# Patient Record
Sex: MALE | Race: WHITE | NOT HISPANIC OR LATINO
[De-identification: names, ages, dates, MRNs, and addresses within clinical notes are randomized per-mention and may not be internally consistent; named-entity substitution may affect disease eponyms.]

---

## 2018-01-25 PROBLEM — Z00.00 ENCOUNTER FOR PREVENTIVE HEALTH EXAMINATION: Status: ACTIVE | Noted: 2018-01-25

## 2018-02-09 ENCOUNTER — APPOINTMENT (OUTPATIENT)
Dept: SURGERY | Facility: CLINIC | Age: 63
End: 2018-02-09

## 2018-11-29 ENCOUNTER — APPOINTMENT (OUTPATIENT)
Dept: SURGERY | Facility: CLINIC | Age: 63
End: 2018-11-29

## 2018-11-29 ENCOUNTER — OUTPATIENT (OUTPATIENT)
Dept: OUTPATIENT SERVICES | Facility: HOSPITAL | Age: 63
LOS: 1 days | End: 2018-11-29
Payer: COMMERCIAL

## 2018-11-29 DIAGNOSIS — H72.01 CENTRAL PERFORATION OF TYMPANIC MEMBRANE, RIGHT EAR: ICD-10-CM

## 2018-11-29 DIAGNOSIS — Z01.818 ENCOUNTER FOR OTHER PREPROCEDURAL EXAMINATION: ICD-10-CM

## 2018-11-29 LAB
ALBUMIN SERPL ELPH-MCNC: 4.7 G/DL — SIGNIFICANT CHANGE UP (ref 3.3–5)
ALP SERPL-CCNC: 91 U/L — SIGNIFICANT CHANGE UP (ref 40–120)
ALT FLD-CCNC: 46 U/L — HIGH (ref 10–45)
ANION GAP SERPL CALC-SCNC: 11 MMOL/L — SIGNIFICANT CHANGE UP (ref 5–17)
APTT BLD: 31.8 SEC — SIGNIFICANT CHANGE UP (ref 27.5–36.3)
AST SERPL-CCNC: 35 U/L — SIGNIFICANT CHANGE UP (ref 10–40)
BILIRUB SERPL-MCNC: 0.7 MG/DL — SIGNIFICANT CHANGE UP (ref 0.2–1.2)
BUN SERPL-MCNC: 12 MG/DL — SIGNIFICANT CHANGE UP (ref 7–23)
CALCIUM SERPL-MCNC: 9.9 MG/DL — SIGNIFICANT CHANGE UP (ref 8.4–10.5)
CHLORIDE SERPL-SCNC: 100 MMOL/L — SIGNIFICANT CHANGE UP (ref 96–108)
CO2 SERPL-SCNC: 27 MMOL/L — SIGNIFICANT CHANGE UP (ref 22–31)
CREAT SERPL-MCNC: 1 MG/DL — SIGNIFICANT CHANGE UP (ref 0.5–1.3)
GLUCOSE SERPL-MCNC: 83 MG/DL — SIGNIFICANT CHANGE UP (ref 70–99)
HCT VFR BLD CALC: 40.9 % — SIGNIFICANT CHANGE UP (ref 39–50)
HGB BLD-MCNC: 13.7 G/DL — SIGNIFICANT CHANGE UP (ref 13–17)
INR BLD: 1.06 — SIGNIFICANT CHANGE UP (ref 0.88–1.16)
MCHC RBC-ENTMCNC: 30.9 PG — SIGNIFICANT CHANGE UP (ref 27–34)
MCHC RBC-ENTMCNC: 33.5 G/DL — SIGNIFICANT CHANGE UP (ref 32–36)
MCV RBC AUTO: 92.1 FL — SIGNIFICANT CHANGE UP (ref 80–100)
PLATELET # BLD AUTO: 272 K/UL — SIGNIFICANT CHANGE UP (ref 150–400)
POTASSIUM SERPL-MCNC: 4.4 MMOL/L — SIGNIFICANT CHANGE UP (ref 3.5–5.3)
POTASSIUM SERPL-SCNC: 4.4 MMOL/L — SIGNIFICANT CHANGE UP (ref 3.5–5.3)
PROT SERPL-MCNC: 8.1 G/DL — SIGNIFICANT CHANGE UP (ref 6–8.3)
PROTHROM AB SERPL-ACNC: 12 SEC — SIGNIFICANT CHANGE UP (ref 10–12.9)
RBC # BLD: 4.44 M/UL — SIGNIFICANT CHANGE UP (ref 4.2–5.8)
RBC # FLD: 12.7 % — SIGNIFICANT CHANGE UP (ref 10.3–16.9)
SODIUM SERPL-SCNC: 138 MMOL/L — SIGNIFICANT CHANGE UP (ref 135–145)
WBC # BLD: 6.8 K/UL — SIGNIFICANT CHANGE UP (ref 3.8–10.5)
WBC # FLD AUTO: 6.8 K/UL — SIGNIFICANT CHANGE UP (ref 3.8–10.5)

## 2018-11-29 PROCEDURE — 93010 ELECTROCARDIOGRAM REPORT: CPT

## 2018-12-12 ENCOUNTER — OUTPATIENT (OUTPATIENT)
Dept: OUTPATIENT SERVICES | Facility: HOSPITAL | Age: 63
LOS: 1 days | Discharge: HOME | End: 2018-12-12

## 2021-12-21 ENCOUNTER — HOSPITAL ENCOUNTER (OUTPATIENT)
Age: 66
Discharge: HOME | End: 2021-12-22
Attending: STUDENT IN AN ORGANIZED HEALTH CARE EDUCATION/TRAINING PROGRAM | Admitting: STUDENT IN AN ORGANIZED HEALTH CARE EDUCATION/TRAINING PROGRAM
Payer: MEDICARE

## 2021-12-21 DIAGNOSIS — I73.9 PVD (PERIPHERAL VASCULAR DISEASE) (HCC): ICD-10-CM

## 2021-12-21 LAB
ACT BLD: 154 SECS (ref 79–138)
ACT BLD: 244 SECS (ref 79–138)

## 2021-12-21 PROCEDURE — 74011000250 HC RX REV CODE- 250: Performed by: STUDENT IN AN ORGANIZED HEALTH CARE EDUCATION/TRAINING PROGRAM

## 2021-12-21 PROCEDURE — 77030002996 HC SUT SLK J&J -A: Performed by: STUDENT IN AN ORGANIZED HEALTH CARE EDUCATION/TRAINING PROGRAM

## 2021-12-21 PROCEDURE — C1725 CATH, TRANSLUMIN NON-LASER: HCPCS | Performed by: STUDENT IN AN ORGANIZED HEALTH CARE EDUCATION/TRAINING PROGRAM

## 2021-12-21 PROCEDURE — C1769 GUIDE WIRE: HCPCS | Performed by: STUDENT IN AN ORGANIZED HEALTH CARE EDUCATION/TRAINING PROGRAM

## 2021-12-21 PROCEDURE — C1887 CATHETER, GUIDING: HCPCS | Performed by: STUDENT IN AN ORGANIZED HEALTH CARE EDUCATION/TRAINING PROGRAM

## 2021-12-21 PROCEDURE — 77030004515 HC CATH ANGI DX AVU ANGI -C: Performed by: STUDENT IN AN ORGANIZED HEALTH CARE EDUCATION/TRAINING PROGRAM

## 2021-12-21 PROCEDURE — 74011000636 HC RX REV CODE- 636: Performed by: STUDENT IN AN ORGANIZED HEALTH CARE EDUCATION/TRAINING PROGRAM

## 2021-12-21 PROCEDURE — C2623 CATH, TRANSLUMIN, DRUG-COAT: HCPCS | Performed by: STUDENT IN AN ORGANIZED HEALTH CARE EDUCATION/TRAINING PROGRAM

## 2021-12-21 PROCEDURE — C1894 INTRO/SHEATH, NON-LASER: HCPCS | Performed by: STUDENT IN AN ORGANIZED HEALTH CARE EDUCATION/TRAINING PROGRAM

## 2021-12-21 PROCEDURE — 74011250637 HC RX REV CODE- 250/637: Performed by: STUDENT IN AN ORGANIZED HEALTH CARE EDUCATION/TRAINING PROGRAM

## 2021-12-21 PROCEDURE — 77030019697 HC SYR ANGI INFL MRTM -B: Performed by: STUDENT IN AN ORGANIZED HEALTH CARE EDUCATION/TRAINING PROGRAM

## 2021-12-21 PROCEDURE — 77030013797 HC KT TRNSDUC PRSSR EDWD -A: Performed by: STUDENT IN AN ORGANIZED HEALTH CARE EDUCATION/TRAINING PROGRAM

## 2021-12-21 PROCEDURE — 37220 HC PTA ILIAC INIT: CPT | Performed by: STUDENT IN AN ORGANIZED HEALTH CARE EDUCATION/TRAINING PROGRAM

## 2021-12-21 PROCEDURE — 99153 MOD SED SAME PHYS/QHP EA: CPT | Performed by: STUDENT IN AN ORGANIZED HEALTH CARE EDUCATION/TRAINING PROGRAM

## 2021-12-21 PROCEDURE — 74011250636 HC RX REV CODE- 250/636: Performed by: STUDENT IN AN ORGANIZED HEALTH CARE EDUCATION/TRAINING PROGRAM

## 2021-12-21 PROCEDURE — 85347 COAGULATION TIME ACTIVATED: CPT

## 2021-12-21 PROCEDURE — 75716 ARTERY X-RAYS ARMS/LEGS: CPT | Performed by: STUDENT IN AN ORGANIZED HEALTH CARE EDUCATION/TRAINING PROGRAM

## 2021-12-21 PROCEDURE — 99152 MOD SED SAME PHYS/QHP 5/>YRS: CPT | Performed by: STUDENT IN AN ORGANIZED HEALTH CARE EDUCATION/TRAINING PROGRAM

## 2021-12-21 PROCEDURE — 36200 PLACE CATHETER IN AORTA: CPT | Performed by: STUDENT IN AN ORGANIZED HEALTH CARE EDUCATION/TRAINING PROGRAM

## 2021-12-21 RX ORDER — HEPARIN SODIUM 200 [USP'U]/100ML
INJECTION, SOLUTION INTRAVENOUS
Status: COMPLETED | OUTPATIENT
Start: 2021-12-21 | End: 2021-12-21

## 2021-12-21 RX ORDER — LIDOCAINE HYDROCHLORIDE 10 MG/ML
INJECTION INFILTRATION; PERINEURAL AS NEEDED
Status: DISCONTINUED | OUTPATIENT
Start: 2021-12-21 | End: 2021-12-21 | Stop reason: HOSPADM

## 2021-12-21 RX ORDER — IODIXANOL 320 MG/ML
INJECTION, SOLUTION INTRAVASCULAR AS NEEDED
Status: DISCONTINUED | OUTPATIENT
Start: 2021-12-21 | End: 2021-12-21 | Stop reason: HOSPADM

## 2021-12-21 RX ORDER — OMEPRAZOLE 40 MG/1
40 CAPSULE, DELAYED RELEASE ORAL
COMMUNITY

## 2021-12-21 RX ORDER — METHOTREXATE 2.5 MG/1
15 TABLET ORAL
COMMUNITY

## 2021-12-21 RX ORDER — FOLIC ACID 1 MG/1
1 TABLET ORAL DAILY
Status: DISCONTINUED | OUTPATIENT
Start: 2021-12-22 | End: 2021-12-22 | Stop reason: HOSPADM

## 2021-12-21 RX ORDER — METHOTREXATE 2.5 MG/1
2.5 TABLET ORAL
Status: DISCONTINUED | OUTPATIENT
Start: 2021-12-25 | End: 2021-12-22 | Stop reason: HOSPADM

## 2021-12-21 RX ORDER — PRAVASTATIN SODIUM 40 MG/1
40 TABLET ORAL
COMMUNITY

## 2021-12-21 RX ORDER — FENTANYL CITRATE 50 UG/ML
INJECTION, SOLUTION INTRAMUSCULAR; INTRAVENOUS AS NEEDED
Status: DISCONTINUED | OUTPATIENT
Start: 2021-12-21 | End: 2021-12-21 | Stop reason: HOSPADM

## 2021-12-21 RX ORDER — GUAIFENESIN 100 MG/5ML
LIQUID (ML) ORAL AS NEEDED
Status: DISCONTINUED | OUTPATIENT
Start: 2021-12-21 | End: 2021-12-21 | Stop reason: HOSPADM

## 2021-12-21 RX ORDER — SODIUM CHLORIDE 0.9 % (FLUSH) 0.9 %
5-40 SYRINGE (ML) INJECTION AS NEEDED
Status: CANCELLED | OUTPATIENT
Start: 2021-12-21

## 2021-12-21 RX ORDER — DILTIAZEM HYDROCHLORIDE 120 MG/1
120 CAPSULE, EXTENDED RELEASE ORAL DAILY
COMMUNITY

## 2021-12-21 RX ORDER — CLOPIDOGREL BISULFATE 75 MG/1
75 TABLET ORAL DAILY
Status: DISCONTINUED | OUTPATIENT
Start: 2021-12-22 | End: 2021-12-22 | Stop reason: HOSPADM

## 2021-12-21 RX ORDER — CLOPIDOGREL BISULFATE 75 MG/1
TABLET ORAL AS NEEDED
Status: DISCONTINUED | OUTPATIENT
Start: 2021-12-21 | End: 2021-12-21 | Stop reason: HOSPADM

## 2021-12-21 RX ORDER — HEPARIN SODIUM 1000 [USP'U]/ML
INJECTION, SOLUTION INTRAVENOUS; SUBCUTANEOUS AS NEEDED
Status: DISCONTINUED | OUTPATIENT
Start: 2021-12-21 | End: 2021-12-21 | Stop reason: HOSPADM

## 2021-12-21 RX ORDER — SODIUM CHLORIDE 0.9 % (FLUSH) 0.9 %
5-40 SYRINGE (ML) INJECTION EVERY 8 HOURS
Status: CANCELLED | OUTPATIENT
Start: 2021-12-21

## 2021-12-21 RX ORDER — MIDAZOLAM HYDROCHLORIDE 1 MG/ML
INJECTION, SOLUTION INTRAMUSCULAR; INTRAVENOUS AS NEEDED
Status: DISCONTINUED | OUTPATIENT
Start: 2021-12-21 | End: 2021-12-21 | Stop reason: HOSPADM

## 2021-12-21 RX ORDER — CYCLOBENZAPRINE HCL 10 MG
10 TABLET ORAL 2 TIMES DAILY
Status: DISCONTINUED | OUTPATIENT
Start: 2021-12-21 | End: 2021-12-22 | Stop reason: HOSPADM

## 2021-12-21 RX ORDER — CYCLOBENZAPRINE HCL 10 MG
10 TABLET ORAL 2 TIMES DAILY
COMMUNITY

## 2021-12-21 RX ORDER — PANTOPRAZOLE SODIUM 40 MG/1
40 TABLET, DELAYED RELEASE ORAL
Status: DISCONTINUED | OUTPATIENT
Start: 2021-12-22 | End: 2021-12-22 | Stop reason: HOSPADM

## 2021-12-21 RX ORDER — DILTIAZEM HYDROCHLORIDE 120 MG/1
120 CAPSULE, COATED, EXTENDED RELEASE ORAL DAILY
Status: DISCONTINUED | OUTPATIENT
Start: 2021-12-22 | End: 2021-12-22 | Stop reason: HOSPADM

## 2021-12-21 RX ORDER — CLOPIDOGREL BISULFATE 75 MG/1
75 TABLET ORAL
COMMUNITY

## 2021-12-21 RX ORDER — FOLIC ACID 1 MG/1
1 TABLET ORAL DAILY
COMMUNITY

## 2021-12-21 RX ORDER — PRAVASTATIN SODIUM 40 MG/1
40 TABLET ORAL
Status: DISCONTINUED | OUTPATIENT
Start: 2021-12-21 | End: 2021-12-22 | Stop reason: HOSPADM

## 2021-12-21 RX ADMIN — PRAVASTATIN SODIUM 40 MG: 40 TABLET ORAL at 21:27

## 2021-12-21 RX ADMIN — CYCLOBENZAPRINE 10 MG: 10 TABLET, FILM COATED ORAL at 16:41

## 2021-12-21 NOTE — Clinical Note
Sheath #1: Sheath: left in place. Site secured by suture. Sheath connected to heparin pressure bag. Hemostasis achieved.

## 2021-12-21 NOTE — PROGRESS NOTES
SHEATH PULL NOTE:    Patient informed of procedure with questions answered with review. Sheath site prepped with Chloraprep swab. 6 fr sheath in rfa pulled by MOUNIKA Shi RN. Hand hold and quick clot, with manual compression to site. No bleeding, no hematoma, no pain at site. Hemostasis obtained with hand hold/manual compression at site. Patient tolerated well. No change in status. Handhold for 25 minutes. No change at site. Occlusive dressing applied to site. No bleeding, no hematoma, no pain/discomfort at site. Groin instructions provided with review. Continue to monitor procedure site and patient status. *Advised patient to keep head flat and extremity flat to decrease risk of bleeding. *Recommended that patient not drink for ONE HOUR post sheath pull completion. *Recommended that patient not eat for TWO HOURS post sheath pull completion. *Instructed patient on rationale for delay of PO products to decrease risk for aspiration and if additional treatment to procedure site is required. Patient verbalized understanding of instructions with review.

## 2021-12-21 NOTE — Clinical Note
Vessel: right CFA, PFA, SFA, popliteal, PTA and peroneal. Hand injection to the artery. Multiple views taken.

## 2021-12-21 NOTE — Clinical Note
Peripheral Lesion 1. Inflation#1: Pressure = 10 aren; Duration = 30 sec. Kissing balloon inflation #1: Pressure = 10 aren; Duration = 30 sec.

## 2021-12-21 NOTE — Clinical Note
Vessel: right AA w/ Runoffiliac, CFA, PFA, SFA, popliteal, PTA and peroneal. Hand injection to the artery. Multiple views taken.

## 2021-12-21 NOTE — H&P
Admission      [facility] Admission H&P    Date of consult:  12/21/21  Date of admission: 12/21/2021  Primary Cardiologist: Keely Chapin        ______________________________________________________________________________    Assessment:    Seen as OP and has PAD and distal SFA disease on LE duplex     Recommendations / Plan:  LE angio and PTA of left SFA   Discussed risk and benefit  He agreed to proceed     ________________________________________________________________________________    CC / Reason for consult:     History of the presenting illness:  Adry Scott is a 77 y.o. with h/p PAD and PTA of left poplitial has intermittent weakness and pain on left side   He reports two different symptoms   One sometimes when he walks uphill has pain in lower leg which is likely related to PAD   He also has weakness and pain in groin after sit in car which is likely not related to PAD - but that only and mostly occurs on left side   We have discussed that 2nd pain though correlated with lower SARAH and disease on left would likely not get better since it is from something else. He understood. Discussed risk and benefit and he agreed to proceed. Prior to Admission medications    Medication Sig Start Date End Date Taking? Authorizing Provider   clopidogreL (PLAVIX) 75 mg tab Take 75 mg by mouth. Yes Provider, Historical   apixaban (Eliquis) 5 mg tablet Take 5 mg by mouth two (2) times a day. Yes Provider, Historical   cyclobenzaprine (FLEXERIL) 10 mg tablet Take 10 mg by mouth two (2) times a day. Yes Provider, Historical   dilTIAZem ER (TIAZAC) 120 mg capsule Take 120 mg by mouth daily. Yes Provider, Historical   folic acid (FOLVITE) 1 mg tablet Take 1 mg by mouth daily. Yes Provider, Historical   methotrexate (RHEUMATREX) 2.5 mg tablet Take 2.5 mg by mouth Every Saturday. Yes Provider, Historical   omeprazole (PRILOSEC) 40 mg capsule Take 40 mg by mouth six (6) times daily.    Yes Provider, Historical   pravastatin (PravachoL) 40 mg tablet Take 40 mg by mouth nightly. Yes Provider, Historical         Social History     Socioeconomic History    Marital status: LEGALLY      Spouse name: Not on file    Number of children: Not on file    Years of education: Not on file    Highest education level: Not on file   Occupational History    Not on file   Tobacco Use    Smoking status: Not on file    Smokeless tobacco: Not on file   Substance and Sexual Activity    Alcohol use: Not on file    Drug use: Not on file    Sexual activity: Not on file   Other Topics Concern    Not on file   Social History Narrative    Not on file     Social Determinants of Health     Financial Resource Strain:     Difficulty of Paying Living Expenses: Not on file   Food Insecurity:     Worried About Running Out of Food in the Last Year: Not on file    Abdullahi of Food in the Last Year: Not on file   Transportation Needs:     Lack of Transportation (Medical): Not on file    Lack of Transportation (Non-Medical):  Not on file   Physical Activity:     Days of Exercise per Week: Not on file    Minutes of Exercise per Session: Not on file   Stress:     Feeling of Stress : Not on file   Social Connections:     Frequency of Communication with Friends and Family: Not on file    Frequency of Social Gatherings with Friends and Family: Not on file    Attends Protestant Services: Not on file    Active Member of [address] [place] [address] or Organizations: Not on file    Attends Club or Organization Meetings: Not on file    Marital Status: Not on file   Intimate Partner Violence:     Fear of Current or Ex-Partner: Not on file    Emotionally Abused: Not on file    Physically Abused: Not on file    Sexually Abused: Not on file   Housing Stability:     Unable to Pay for Housing in the Last Year: Not on file    Number of [place] in the Last Year: Not on file    Unstable Housing in the Last Year: Not on file       Visit [facility] (!) 132/94   Pulse 71   Temp 97.7 °F (36.5 °C)   Resp 16   Ht 6' 3\" (1.905 m)   Wt 81.6 kg (180 lb)   SpO2 100%   BMI 22.50 kg/m²     Physical Exam  A+O X3 No distress   No pedal edema   RRR  Clear lung, no respi distress   Abd soft and non tender   Extremity warm and no significant edema     Lab review:   Reviewed from OP     Data review:         Signed:  Lopez Rios MD  Interventional Cardiology  12/21/2021

## 2021-12-21 NOTE — Clinical Note
Peripheral Lesion 1. Inflation#1: Pressure = 6 aren; Duration = 180 sec. Kissing balloon inflation #1: Pressure = 4 aren; Duration = 30 sec.

## 2021-12-21 NOTE — Clinical Note
Vessel: bilateral AA w/ Runoff. Power injection to the artery. Single view taken. PSI = 900. Injection rate = 10 mL/sec. Total injected volume = 20 mL.

## 2021-12-21 NOTE — Clinical Note
Aspirin Registry Question:   Aspirin was discussed with physician prior to the procedure. Aspirin was not given prior to the procedure. PT HAS HX OF GASTRIC ULCERS.   ADVISED NOT TO TAKE ASPIRIN BY GI DOCTOR

## 2021-12-21 NOTE — PROCEDURES
Brief Procedure Note:         Indication:   PAD  Distal SFA disease with Severely elevated velocity on duplex     Procedure:   Peripheral angiogram   Abdominal aortogram with runoff       Complications: None   Blood loss: Minimal   Condition: Stable     Brief procedure Result:    Moderate to severe disease of distal SFA   PTA of distal SFA with DCB     Recommendations:   Continue plavix, had aspirin today   Resume apixaban tomorrow and continue plavix and apixaban   Sheath pull when ACT <160     Full note and recommendations to follow. '

## 2021-12-21 NOTE — Clinical Note
Vessel: left iliac, CFA, PFA, SFA, popliteal, PTA, peroneal and NELIDA. Hand injection to the artery. Multiple views taken.

## 2021-12-21 NOTE — Clinical Note
TRANSFER - OUT REPORT:     Verbal report given to: Brigid Damon. Report consisted of patient's Situation, Background, Assessment and   Recommendations(SBAR). Opportunity for questions and clarification was provided. Patient transported with a Registered Nurse.

## 2021-12-22 VITALS
SYSTOLIC BLOOD PRESSURE: 128 MMHG | TEMPERATURE: 98.6 F | BODY MASS INDEX: 22.38 KG/M2 | RESPIRATION RATE: 14 BRPM | HEIGHT: 75 IN | OXYGEN SATURATION: 100 % | DIASTOLIC BLOOD PRESSURE: 102 MMHG | HEART RATE: 111 BPM | WEIGHT: 180 LBS

## 2021-12-22 PROCEDURE — 76937 US GUIDE VASCULAR ACCESS: CPT

## 2021-12-22 PROCEDURE — 74011250637 HC RX REV CODE- 250/637: Performed by: STUDENT IN AN ORGANIZED HEALTH CARE EDUCATION/TRAINING PROGRAM

## 2021-12-22 RX ADMIN — DILTIAZEM HYDROCHLORIDE 120 MG: 120 CAPSULE, COATED, EXTENDED RELEASE ORAL at 08:54

## 2021-12-22 RX ADMIN — CYCLOBENZAPRINE 10 MG: 10 TABLET, FILM COATED ORAL at 08:54

## 2021-12-22 RX ADMIN — PANTOPRAZOLE SODIUM 40 MG: 40 TABLET, DELAYED RELEASE ORAL at 08:56

## 2021-12-22 RX ADMIN — APIXABAN 5 MG: 5 TABLET, FILM COATED ORAL at 08:56

## 2021-12-22 RX ADMIN — FOLIC ACID 1 MG: 1 TABLET ORAL at 08:54

## 2021-12-22 RX ADMIN — CLOPIDOGREL BISULFATE 75 MG: 75 TABLET ORAL at 08:56

## 2021-12-22 NOTE — DISCHARGE SUMMARY
Cardiology Discharge Summary     Patient ID: Barry Jackson  [id number]  34 y.o.  1955    Admit Date: 12/21/2021  Discharge Date: 12/22/2021   Admitting Physician: Estefany Swanson MD   Discharge Physician: Estefany Swanson MD    Admission Diagnoses: PVD (peripheral vascular disease) [facility]) [I73.9]    Discharge Diagnoses: PAD     Hospital Course:  Mr Karlie Barber has leg pain and has abnormal LE duplex showing significant disease of distal left SFA. He was scheduled for LE intervention. Has PTA of left distal SFA with DCB through right femoral artery access. Doing well. Right femoral access site okay. Cont plavix and apixaban. Visit Vitals  /75 (BP 1 Location: Right arm, BP Patient Position: At rest)   Pulse 80   Temp 98.5 °F (36.9 °C)   Resp 16   Ht 6' 3\" (1.905 m)   Wt 81.6 kg (180 lb)   SpO2 99%   BMI 22.50 kg/m²       Examination :   General: Alert and oriented x3, no distress   HEENT: Neck supple, MMM, no JVP elevation   CV: S1S2, Regular rate and rhythm  Resp: Lungs clear, no distress   Abd: Soft, NT/ND   LE: No edema, warm   Neuro: Oriented x3, no focal deficit   Skin: Warm and dry          Patient was ambulating without symptoms prior to d/c.   }  Patient Instructions:   Current Discharge Medication List      CONTINUE these medications which have NOT CHANGED    Details   clopidogreL (PLAVIX) 75 mg tab Take 75 mg by mouth. apixaban (Eliquis) 5 mg tablet Take 5 mg by mouth two (2) times a day. cyclobenzaprine (FLEXERIL) 10 mg tablet Take 10 mg by mouth two (2) times a day. dilTIAZem ER (TIAZAC) 120 mg capsule Take 120 mg by mouth daily. folic acid (FOLVITE) 1 mg tablet Take 1 mg by mouth daily. methotrexate (RHEUMATREX) 2.5 mg tablet Take 2.5 mg by mouth Every Saturday. omeprazole (PRILOSEC) 40 mg capsule Take 40 mg by mouth six (6) times daily. pravastatin (PravachoL) 40 mg tablet Take 40 mg by mouth nightly.              Referenced discharge instructions provided by nursing for diet and activity.   Follow-up:   Follow-up Information     Follow up With Specialties Details Why Contact Info    None    None ([phone]) Patient stated that they have no PCP      Caitie Greenfield MD Cardiology, Internal Medicine In 2 weeks  [address]  [address]  [facility] [address]  [phone]              > 30 minutes coordinating discharge  Yes     Signed:  Charo Bowman MD  12/22/2021  7:59 AM

## 2021-12-22 NOTE — PROGRESS NOTES
1915 - Bedside report from Rolanda BERNAL.  SR.  No complaints. R groin benign. Doppler pulses. Up w assist.      2050 - Walked in hallway long distance without complaints. R groin benign.       - Bedside report to Rn. No complains.   SR.

## 2021-12-22 NOTE — DISCHARGE INSTRUCTIONS
[address] [facility], [address]   [phone]  [place], [address] [facility]    [email or web address]    Patient Discharge Instructions    Kishan Sharif / [id number] : 1955    Admitted 2021 Discharged: 2021       · It is important that you take the medication exactly as they are prescribed. · Keep your medication in the bottles provided by the pharmacist and keep a list of the medication names, dosages, and times to be taken in your wallet. · Do not take other medications without consulting your doctor. BRING ALL OF YOUR MEDICINES TO YOUR OFFICE VISIT with Kings Lewis MD or my nurse practitioner. Follow-up with Kings Lewis MD or my nurse practitioner in 2 weeks. Cardiac Catheterization  Discharge Instructions    Transradial Catheterization Discharge Instructions (WRIST)    Discharge instructions: Your radial artery in your wrist was used for your cardiac catheterization. This site may be slightly bruised and sore following your procedure. Expect mild tingling or the hand and tenderness at the puncture site for up to 3 days. Excess movement of the wrist used should be avoided for the next 24-48 hours. 1. No lifting over 2 pounds (approximately a ½ gallon of milk) with this arm for 24 hours. 2. Keep the site of the procedure covered with a bandage for 24 hours. 3. You may shower the day after your procedure. Do not take a tub bath or submerge the puncture site in water for 48 hours. 4. No heavy impact activity/lifting > 10 pounds for 1 week. If bleeding of the wrist occurs at home:   If the site on your wrist where you had the catheterization procedure begins to bleed, do not panic. 1. Place 1 or 2 fingers over the puncture site and hold pressure to stop the bleeding. You may be able to feel your pulse as you hold pressure. 2. Lift your fingers after 5 minutes to see if the bleeding has stopped.    3. Once the bleeding has stopped, gently wipe the wrist area clean and cover with a bandage. If the bleeding from your wrist does not stop after 15 minutes, or if there is a large amount of bleeding or spurting, call 911 immediately (do not drive yourself to the hospital). Other concerns: The site may be slightly bruised and sore following your procedure. Should any of the following occur, contact your physician immediately:   1. Any cool or coldness of the arm, discoloration over a large area, ongoing numbness or any abnormal sensations , moderate to severe pain or swelling in the arm. 2. Redness, soreness, swelling, chills or fever, or colored drainage at the procedure site within 3-7 days after your procedure. If you have any further questions or concerns regarding your procedure please call the Cardiac Cath Lab office at [phone]. During regular business hours ask to speak to Dr. Jalyn Roberson. During non-business hours the answering service will answer. Ask to speak to the physician on call for [facility].     Transfemoral Catheterization Discharge Instructions Jonnathan Topete)     Do not drive, operate any machinery, or sign any legal documents for 24 hours after your procedure. You must have someone to drive you home.  You may take a shower 24 hours after your cardiac catheterization. Be sure to get the dressing wet and then remove it; gently wash the area with warm soapy water. Pat dry and leave open to air. To help prevent infections, be sure to keep the cath site clean and dry. No lotions, creams, powders, ointments, etc. in the cath site for approximately 1 week.  Do not take a tub bath, get in a hot tub or swimming pool for approximately 5 days or until the cath site is completely healed.  No strenuous activity or heavy lifting over 10 lbs. for 7 days.  Drink plenty of fluids for 24-48 hours after your cath to flush the contrast dye from your kidneys. No alcoholic beverages for 24 hours.   You may resume your previous diet (low fat, low cholesterol) after your cath.  After your cath, some bruising or discomfort is common during the healing process. Tylenol, 1-2 tablets every 6 hours as needed, is recommended if you experience any discomfort. If you experience any signs or symptoms of infection such as fever, chills, or poorly healing incision, persistent tenderness or swelling in the groin, redness and/or warmth to the touch, numbness, significant tingling or pain at the groin site or affected extremity, rash, drainage from the cath site, or if the leg feels tight or swollen, call your physician right away.  If bleeding at the cath site occurs, take a clean gauze pad and apply direct pressure to the groin just above the puncture site. Call 911 immediately, and continue to apply direct pressure until an ambulance gets to your location.  You may return to work  2  days after your cardiac cath if no groin bleeding. Information obtained by :  I understand that if any problems occur once I am at home I am to contact my physician. I understand and acknowledge receipt of the instructions indicated above. R.N.'s Signature                                                                  Date/Time                                                                                                                                              Patient or Representative Signature                                                          Date/Time      Isabel Christian MD             [address] [facility] [facility], [address]    [phone]  [place], [address]    [email or web address]

## 2023-05-15 RX ORDER — CYCLOBENZAPRINE HCL 10 MG
10 TABLET ORAL 2 TIMES DAILY
COMMUNITY

## 2023-05-15 RX ORDER — OMEPRAZOLE 40 MG/1
40 CAPSULE, DELAYED RELEASE ORAL
COMMUNITY

## 2023-05-15 RX ORDER — CLOPIDOGREL BISULFATE 75 MG/1
75 TABLET ORAL
COMMUNITY

## 2023-05-15 RX ORDER — DILTIAZEM HYDROCHLORIDE 120 MG/1
120 CAPSULE, EXTENDED RELEASE ORAL DAILY
COMMUNITY

## 2023-05-15 RX ORDER — PRAVASTATIN SODIUM 40 MG
40 TABLET ORAL NIGHTLY
COMMUNITY

## 2023-05-15 RX ORDER — FOLIC ACID 1 MG/1
1 TABLET ORAL DAILY
COMMUNITY

## 2024-10-02 NOTE — PROGRESS NOTES
Pharmacist Discharge Medication Reconciliation    Significant PMH: No past medical history on file. Encounter Diagnoses:   Encounter Diagnosis   Name Primary? PVD (peripheral vascular disease) (HCC)      Allergies: Aspirin    Discharge Medications:   Current Discharge Medication List        CONTINUE these medications which have NOT CHANGED    Details   clopidogreL (PLAVIX) 75 mg tab Take 75 mg by mouth. apixaban (Eliquis) 5 mg tablet Take 5 mg by mouth two (2) times a day. cyclobenzaprine (FLEXERIL) 10 mg tablet Take 10 mg by mouth two (2) times a day. dilTIAZem ER (TIAZAC) 120 mg capsule Take 120 mg by mouth daily. folic acid (FOLVITE) 1 mg tablet Take 1 mg by mouth daily. methotrexate (RHEUMATREX) 2.5 mg tablet Take 15 mg by mouth Every Saturday. omeprazole (PRILOSEC) 40 mg capsule Take 40 mg by mouth six (6) times daily. pravastatin (PravachoL) 40 mg tablet Take 40 mg by mouth nightly. The patient's chart, MAR and AVS were reviewed by Asif Bull [facility].     Discharging Provider: Silke Floyd MD    Thank you,     Asif Bull, [facility] What Type Of Note Output Would You Prefer (Optional)?: Standard Output How Severe Are Your Spot(S)?: mild Have Your Spot(S) Been Treated In The Past?: has been treated Hpi Title: Evaluation of Skin Lesions Additional History: Patient states he was bitten by a horse fly a couple weeks ago.

## (undated) DEVICE — Device: Brand: QUICK-CROSS SUPPORT CATHETER

## (undated) DEVICE — PINNACLE INTRODUCER SHEATH: Brand: PINNACLE

## (undated) DEVICE — CUSTOM KT PTCA INFL DEV K05 00053H

## (undated) DEVICE — DESTINATION PERIPHERAL GUIDING SHEATH: Brand: DESTINATION

## (undated) DEVICE — Z DUPLICATE USE 2103554 VALVE HEMOSTATIC BLEEDBK CTRL COPILOT

## (undated) DEVICE — 3M™ TEGADERM™ TRANSPARENT FILM DRESSING FRAME STYLE, 1626W, 4 IN X 4-3/4 IN (10 CM X 12 CM), 50/CT 4CT/CASE: Brand: 3M™ TEGADERM™

## (undated) DEVICE — PRESSURE MONITORING SET: Brand: TRUWAVE

## (undated) DEVICE — RADIFOCUS GLIDEWIRE ADVANTAGE GUIDEWIRE: Brand: GLIDEWIRE ADVANTAGE

## (undated) DEVICE — DRAPE PRB US TRNSDCR 6X96IN --

## (undated) DEVICE — GUIDEWIRE VASC L145CM 0.035IN J TIP L3MM PTFE FIX COR NAMIC

## (undated) DEVICE — HI-TORQUE VERSACORE MODIFIED J GUIDE WIRE SYSTEM 260 CM: Brand: HI-TORQUE VERSACORE

## (undated) DEVICE — KIT ACCS INTRO 4FR L10CM NDL 21GA L7CM GWIRE L40CM

## (undated) DEVICE — CATHETER ANGIO 5FR L70CM GWIRE 0.035IN 1CM SPC OMNI FLSH 21

## (undated) DEVICE — CATHETER PTCA L135CM BLLN L60MM DIA5MM INTRO SHTH 5FR 10ATM

## (undated) DEVICE — Device

## (undated) DEVICE — SUTURE PERMAHAND SZ 2-0 L18IN NONABSORBABLE BLK L26MM PS 1588H